# Patient Record
Sex: MALE | Race: WHITE | Employment: OTHER | URBAN - METROPOLITAN AREA
[De-identification: names, ages, dates, MRNs, and addresses within clinical notes are randomized per-mention and may not be internally consistent; named-entity substitution may affect disease eponyms.]

---

## 2022-10-24 ENCOUNTER — OFFICE VISIT (OUTPATIENT)
Dept: BARIATRICS/WEIGHT MGMT | Age: 42
End: 2022-10-24
Payer: MEDICAID

## 2022-10-24 VITALS
DIASTOLIC BLOOD PRESSURE: 77 MMHG | BODY MASS INDEX: 50.62 KG/M2 | SYSTOLIC BLOOD PRESSURE: 120 MMHG | TEMPERATURE: 97.5 F | HEART RATE: 85 BPM | HEIGHT: 66 IN | WEIGHT: 315 LBS

## 2022-10-24 DIAGNOSIS — I10 ESSENTIAL HYPERTENSION: Primary | ICD-10-CM

## 2022-10-24 DIAGNOSIS — E66.01 CLASS 3 SEVERE OBESITY DUE TO EXCESS CALORIES WITH SERIOUS COMORBIDITY AND BODY MASS INDEX (BMI) OF 50.0 TO 59.9 IN ADULT (HCC): ICD-10-CM

## 2022-10-24 PROBLEM — K21.9 GASTROESOPHAGEAL REFLUX DISEASE: Status: ACTIVE | Noted: 2022-05-20

## 2022-10-24 PROBLEM — E11.9 DIABETES MELLITUS (HCC): Status: ACTIVE | Noted: 2021-06-09

## 2022-10-24 PROCEDURE — G8427 DOCREV CUR MEDS BY ELIG CLIN: HCPCS | Performed by: INTERNAL MEDICINE

## 2022-10-24 PROCEDURE — G8417 CALC BMI ABV UP PARAM F/U: HCPCS | Performed by: INTERNAL MEDICINE

## 2022-10-24 PROCEDURE — 1036F TOBACCO NON-USER: CPT | Performed by: INTERNAL MEDICINE

## 2022-10-24 PROCEDURE — 99202 OFFICE O/P NEW SF 15 MIN: CPT

## 2022-10-24 PROCEDURE — G8484 FLU IMMUNIZE NO ADMIN: HCPCS | Performed by: INTERNAL MEDICINE

## 2022-10-24 PROCEDURE — 99204 OFFICE O/P NEW MOD 45 MIN: CPT | Performed by: INTERNAL MEDICINE

## 2022-10-24 RX ORDER — DULAGLUTIDE 4.5 MG/.5ML
4.5 INJECTION, SOLUTION SUBCUTANEOUS
COMMUNITY

## 2022-10-24 RX ORDER — LISINOPRIL 10 MG/1
TABLET ORAL
COMMUNITY

## 2022-10-24 RX ORDER — ROSUVASTATIN CALCIUM 20 MG/1
TABLET, COATED ORAL
COMMUNITY

## 2022-10-24 RX ORDER — OMEPRAZOLE 40 MG/1
CAPSULE, DELAYED RELEASE ORAL
COMMUNITY

## 2022-10-24 NOTE — PROGRESS NOTES
CC -   HTN, DM2, Obesity    Would like to be at least <200 lbs (150-160 lbs)    BACKGROUND -   First visit: 10/24/22    Obesity (all weight in lbs)  Began in childhood  Initial Ht 65.5, Wt 357.4,  BMI 58.57  HS Grad wt little silvia 300 (~325 lbs)   Lowest   wt 300-325   Highest  wt 450 (2.5-3 years ago)  Pattern of wt gain: gradual  Wt change past yr: -100 lbs  Most wt lost: 100 lbs  Other diets attempted: watching portion size, cut down carbohydrate    Desire to lose weight: ---/10    Initial Diet:    Number of meals per day - 2 (s-L)    Number of snacks per day - 1    Meal volume - 12\" plate,  usually seconds    Fast food/convenience store - 2x/week    Restaurants (not fast food) - 0x/week (rare)   Sweets - 7d/week (2 miniatures or 2 regular oreos etc)   Chips - 0d/week   Crackers/pretzels - 0d/week   Nuts - 0d/week   Peanut Butter - 4-5d/week (1 tbsp on toast)   Popcorn - 0d/week   Dried fruit - 0d/week   Whole fruit - 0-1d/week   Breakfast cereal - 0d/week   Granola/Protein/Energy bar - 0d/week   Sugar sweetened beverages - diet soda 1 can day with dinner, no fruit juice, no coffee or tea, no energy drink (mostly water at least 64 oz/day)   Protein - No supplements   Fiber - No supplements    Wt effect of HR foods = 1400 Reinaldo/wk = 200 Reinaldo/d= 8% DEN = 21 lb/year. Initial Exercise:    [employer] - not currently    Walking - few x/wk 20-30 min 1-2x/wk    Running - no    Resistance - no    Aerobic class - no    ______________________    [facility] [place] [facility] -  Past Medical History:   Diagnosis Date    DM2 (diabetes mellitus, type 2) (HCC)     GERD (gastroesophageal reflux disease)     HTN (hypertension)     Hyperlipidemia      Past Surgical History:   Procedure Laterality Date    WISDOM TOOTH EXTRACTION       Prior to Admission medications    Medication Sig Start Date End Date Taking?  Authorizing Provider   Multiple Vitamin (MULTIVITAMIN ADULT PO) multivitamin   daily prn    Historical Provider, MD   Dulaglutide (Sean Layne) 4.5 MG/0.5ML SOPN 4.5 mg    Historical Provider, MD   empagliflozin (JARDIANCE) 25 MG tablet Jardiance 25 mg tablet   take 1 tablet by mouth every morning    Historical Provider, MD   Lisdexamfetamine Dimesylate (VYVANSE) 20 MG CAPS Vyvanse 20 mg capsule   Take 1 capsule every day by oral route in the morning for 30 days. Historical Provider, MD   lisinopril (PRINIVIL;ZESTRIL) 10 MG tablet lisinopril 10 mg tablet   one daily    Historical Provider, MD   omeprazole (PRILOSEC) 40 MG delayed release capsule omeprazole 40 mg capsule,delayed release   Take 1 capsule every day by oral route in the morning for 90 days. Historical Provider, MD   rosuvastatin (CRESTOR) 20 MG tablet rosuvastatin 20 mg tablet   Take 1 tablet every day by oral route in the evening for 90 days. Historical Provider, MD   Lisinopril 5 mg daily currently. Allergies: No Known Allergies Stomach upset with Metformin. Social history: smoking: never ; Alcohol: no , work:  (mostly sitting). ROS -  Card - no CP  GI - no N/V/D/C    PE -  Gen : /77 (Site: Left Upper Arm, Position: Sitting, Cuff Size: Large Adult)   Pulse 85   Temp 97.5 °F (36.4 °C) (Temporal)   Ht 5' 5.5\" (1.664 m)   Wt (!) 357 lb 6.4 oz (162.1 kg)   BMI 58.57 kg/m²    WN, WD, NAD  Lung: Nml resp effort, CTA B/L  Heart:  RRR w/o MGR, trace LE pitting edema on right, 1+ edema on left  Psych: Normal mood   Full affect  Neuro: Moves all ext well    Last a1c was 8 per pt.  ______________________    HISTORY & ASSESSMENT/PLAN -     Problem 1 - Hypertension   HPI   - controlled on current medication (lisinopril), patient asymptomatic. Assessment  - controlled  Plan   - Continue medication. Weight reduction can help.  Weight reduction per plan below      Problem 2  - Obesity   HPI   - See above Background for description    Weight  Date    357.4  10/24/22    Patient's estimated daily energy need (DEN) = 2560 Reinaldo/d = 73432 Reinaldo/wk    Assessment  - Uncontrolled    Plan   - Talked about various options. Would like to continue calorie conscious diet as detailed below. He has Trulicity for DM, and on Vyvanse currently 20 mg daily with a plan to taper to 10 and then stop. He would like to see how he does off the medicine first. Planned as follows:  Patient Instructions   Rules:  Continue calorie conscious diet (target <1700 Reinaldo/d)  Limit sweets to one day per month  Limit chips/crackers/pretzels/nuts/popcorn to 150 reinaldo/day  Eliminate all sugar sweetened beverages (including fruit juice)  Limit restaurants (including fast food and food from a convenience store) to one time every two weeks while in town    Requirements:  Make sure protein intake is at least 70 grams per day (if you take a supplement it should be <200 Reinaldo, >25 g protein)  Make sure that fiber intake is at least 25 grams per day  Take one multivitamin every day    Targets:  Walk 30 minutes daily  Avoid eating 2 hours within bedtime. Tips:  Do not eat outside of the dining room or the kitchen  Do not eat while watching TV, videos, working on the computer or using a smart phone  Do not eat food out of a multi-serving bag or container. Follow up in 4 weeks. Total time spent on encounter: 45 min. April Franco MD  Internal Medicine/Obesity Medicine  10/24/2022.

## 2022-10-24 NOTE — PATIENT INSTRUCTIONS
Rules:  Continue calorie conscious diet (target <1700 Reinaldo/d)  Limit sweets to one day per month  Limit chips/crackers/pretzels/nuts/popcorn to 150 reinaldo/day  Eliminate all sugar sweetened beverages (including fruit juice)  Limit restaurants (including fast food and food from a convenience store) to one time every two weeks while in town    Requirements:  Make sure protein intake is at least 70 grams per day (if you take a supplement it should be <200 Reinaldo, >25 g protein)  Make sure that fiber intake is at least 25 grams per day  Take one multivitamin every day    Targets:  Walk 30 minutes daily  Avoid eating 2 hours within bedtime. Tips:  Do not eat outside of the dining room or the kitchen  Do not eat while watching TV, videos, working on the computer or using a smart phone  Do not eat food out of a multi-serving bag or container. Follow up in 4 weeks.

## 2022-11-22 ENCOUNTER — OFFICE VISIT (OUTPATIENT)
Dept: BARIATRICS/WEIGHT MGMT | Age: 42
End: 2022-11-22
Payer: MEDICAID

## 2022-11-22 VITALS
HEART RATE: 86 BPM | DIASTOLIC BLOOD PRESSURE: 84 MMHG | SYSTOLIC BLOOD PRESSURE: 129 MMHG | WEIGHT: 315 LBS | BODY MASS INDEX: 50.62 KG/M2 | TEMPERATURE: 97.7 F | HEIGHT: 66 IN

## 2022-11-22 DIAGNOSIS — E66.01 CLASS 3 SEVERE OBESITY DUE TO EXCESS CALORIES WITH SERIOUS COMORBIDITY AND BODY MASS INDEX (BMI) OF 50.0 TO 59.9 IN ADULT (HCC): ICD-10-CM

## 2022-11-22 DIAGNOSIS — I10 ESSENTIAL HYPERTENSION: Primary | ICD-10-CM

## 2022-11-22 PROCEDURE — 1036F TOBACCO NON-USER: CPT | Performed by: INTERNAL MEDICINE

## 2022-11-22 PROCEDURE — 3078F DIAST BP <80 MM HG: CPT | Performed by: INTERNAL MEDICINE

## 2022-11-22 PROCEDURE — 3074F SYST BP LT 130 MM HG: CPT | Performed by: INTERNAL MEDICINE

## 2022-11-22 PROCEDURE — G8427 DOCREV CUR MEDS BY ELIG CLIN: HCPCS | Performed by: INTERNAL MEDICINE

## 2022-11-22 PROCEDURE — G8417 CALC BMI ABV UP PARAM F/U: HCPCS | Performed by: INTERNAL MEDICINE

## 2022-11-22 PROCEDURE — G8484 FLU IMMUNIZE NO ADMIN: HCPCS | Performed by: INTERNAL MEDICINE

## 2022-11-22 PROCEDURE — 99211 OFF/OP EST MAY X REQ PHY/QHP: CPT

## 2022-11-22 PROCEDURE — 99214 OFFICE O/P EST MOD 30 MIN: CPT | Performed by: INTERNAL MEDICINE

## 2022-11-22 RX ORDER — PHENTERMINE HYDROCHLORIDE 37.5 MG/1
37.5 TABLET ORAL
Qty: 30 TABLET | Refills: 0 | Status: SHIPPED | OUTPATIENT
Start: 2022-11-22 | End: 2022-12-22

## 2022-11-22 NOTE — PATIENT INSTRUCTIONS
Rules:  Continue calorie conscious diet (target <1700 Reinaldo/d)  Limit sweets to one day per month  Limit chips/crackers/pretzels/nuts/popcorn to 150 reinaldo/day  Eliminate all sugar sweetened beverages (including fruit juice)  Limit restaurants (including fast food and food from a convenience store) to one time every two weeks while in town    Requirements:  Make sure protein intake is at least 70 grams per day  Make sure that fiber intake is at least 25 grams per day  Take one multivitamin every day    Targets:  Walk 30 minutes daily  Avoid eating 2 hours within bedtime. Tips:  Do not eat outside of the dining room or the kitchen  Do not eat while watching TV, videos, working on the computer or using a smart phone  Do not eat food out of a multi-serving bag or container. Low calorie non-starchy vegetables:  Food (per 100 g) Calories Fibers T. Carbohydrates Protein Fat Sodium (mg) Potassium (mg)   Green Bell Peppers 10 1.7 4.6 0.9 0.2 3 175   Cucumbers 15 0.5 3.6 0.7 0.1 2 147   Celery 16 1.6 3 0.7 0.2 80 260   Tomatoes 18 1.2 3.9 0.9 0.2 5 237   Carrots 41 2.8 10 0.9 0.2 69 320   Cabbage 25 2.5 6 1.3 0.1 18 170   Broccoli 35 3.3 7.2 2.4 0.4 41 293   Cauliflower 23 2.3 4.1 1.8 0.5 15 142   Iceberg Lettuce 14 1.2 3 0.9 0.1 10 141   Kale 28 2 5.6 1.9 0.4 23 228   Brussel Sprouts 43 3.8 9 3.4 0.3 25 389   Spinach 23 2.4 3.8 3 0.3 70 466   Zucchini 15 1 2.7 1.1 0.4 3 264   Mushroom 28 2.2 5.3 2.2 0.5 2 356   Asparagus 22 2 4.1 2.4 0.2 14 224   Green Beans 35 3.2 7.9 1.9 0.3 1 146   Eggplant 35 2.5 8.7 0.8 0.2 1 123       Phentermine:  Take phentermine 37.5 mg, one-half to one tablet daily as needed for appetite suppression. Take each dose 30-90 min before effect will be needed. While taking phentermine, check the Blood Pressure every morning and every evening. If the systolic BP is >384 mmHg, the diastolic BP is >27 mm/Hg or the heart rate is > 100 beats per minute, do not take phentermine that day.     If the systolic BP is consistently >155 mmHg, the diastolic BP is consistently above 90 mm/Hg or the heart rate is consistently > 100 beats per minute, then stop taking phentermine altogether. If the systolic BP >144 mmHg or the diastolic BP is >210 mmHg (even if it is only once), then phentermine should be stopped altogether without proving that any of these are consistently elevated. Follow up in 30 days.

## 2022-11-22 NOTE — PROGRESS NOTES
CC -   Follow up of: HTN, DM2, Obesity    Would like to be at least <200 lbs (150-160 lbs)    BACKGROUND -   Last visit: 10/24/22  First visit: 10/24/22    Obesity (all weight in lbs)  Began in childhood  Initial Ht 65.5, Wt 357.4,  BMI 58.57  HS Grad wt little silvia 300 (~325 lbs)   Lowest   wt 300-325   Highest  wt 450 (2.5-3 years ago)  Pattern of wt gain: gradual  Wt change past yr: -100 lbs  Most wt lost: 100 lbs  Other diets attempted: watching portion size, cut down carbohydrate    Desire to lose weight: ---/10    Initial Diet:    Number of meals per day - 2 (s-L)    Number of snacks per day - 1    Meal volume - 12\" plate,  usually seconds    Fast food/convenience store - 2x/week    Restaurants (not fast food) - 0x/week (rare)   Sweets - 7d/week (2 miniatures or 2 regular oreos etc)   Chips - 0d/week   Crackers/pretzels - 0d/week   Nuts - 0d/week   Peanut Butter - 4-5d/week (1 tbsp on toast)   Popcorn - 0d/week   Dried fruit - 0d/week   Whole fruit - 0-1d/week   Breakfast cereal - 0d/week   Granola/Protein/Energy bar - 0d/week   Sugar sweetened beverages - diet soda 1 can day with dinner, no fruit juice, no coffee or tea, no energy drink (mostly water at least 64 oz/day)   Protein - No supplements   Fiber - No supplements    Wt effect of HR foods = 1400 Reinaldo/wk = 200 Reinaldo/d= 8% DEN = 21 lb/year. Initial Exercise:    [employer] - not currently    Walking - few x/wk 20-30 min 1-2x/wk    Running - no    Resistance - no    Aerobic class - no    ______________________    [address] [place] [address] -  Past Medical History:   Diagnosis Date    DM2 (diabetes mellitus, type 2) (HCC)     GERD (gastroesophageal reflux disease)     HTN (hypertension)     Hyperlipidemia      Past Surgical History:   Procedure Laterality Date    WISDOM TOOTH EXTRACTION       Prior to Admission medications    Medication Sig Start Date End Date Taking?  Authorizing Provider   Multiple Vitamin (MULTIVITAMIN ADULT PO) multivitamin   daily prn    Historical Provider, MD   Dulaglutide (TRULICITY) 4.5 GT/7.4LX SOPN 4.5 mg    Historical Provider, MD   empagliflozin (JARDIANCE) 25 MG tablet Jardiance 25 mg tablet   take 1 tablet by mouth every morning    Historical Provider, MD   lisinopril (PRINIVIL;ZESTRIL) 10 MG tablet lisinopril 10 mg tablet   one daily    Historical Provider, MD   omeprazole (PRILOSEC) 40 MG delayed release capsule omeprazole 40 mg capsule,delayed release   Take 1 capsule every day by oral route in the morning for 90 days. Historical Provider, MD   rosuvastatin (CRESTOR) 20 MG tablet rosuvastatin 20 mg tablet   Take 1 tablet every day by oral route in the evening for 90 days. Historical Provider, MD   Lisinopril 5 mg daily currently. Allergies: No Known Allergies Stomach upset with Metformin. Social history: smoking: never ; Alcohol: no , work:  (mostly sitting). ROS -  Card - no CP  GI - no N/V/D/C    PE -  Gen : /84 (Site: Left Upper Arm, Position: Sitting, Cuff Size: Large Adult)   Pulse 86   Temp 97.7 °F (36.5 °C) (Temporal)   Ht 5' 5.5\" (1.664 m)   Wt (!) 358 lb (162.4 kg)   BMI 58.67 kg/m²    WN, WD, NAD  Lung: Nml resp effort, CTA B/L  Heart:  RRR w/o MGR, trace LE pitting edema on right, 1+ edema on left  Psych: Normal mood   Full affect  Neuro: Moves all ext well    Last a1c was 8 per pt.  ______________________    HISTORY & ASSESSMENT/PLAN -     Problem 1 - Hypertension   HPI   - controlled on current medication (lisinopril), patient asymptomatic. Assessment  - controlled  Plan   - Continue medication. Weight reduction can help de-escalate dose. Weight reduction per plan below      Problem 2  - Obesity   HPI   - See above Background for description    Weight  Date    357.4  10/24/22    358.0  11/22/22    Total weight change to date: +0.6 lbs. Average daily energy variance:  10/24/2022 - 11/22/2022: +0.6 lbs (2100 Reinaldo)/28 d = +75 Reinaldo/d excess.     Patient's estimated daily energy need (DEN) = 2560 Reinaldo/d = 07128 Reinaldo/wk    Update:  Calorie conscious diet  Sweets: 30d/month (small amount every day)  SSB: diet soda, no fruit juice  Restaurant food: 2-3x/wk  Appetite suppressant: on Trulicity, off Vyvanse now. Home BP monitoring: None currently  Protein: chicken, ground turkey, fish, deli ham, cheese/cottage cheese, peanut butter  Fiber: vegetables  Water: 20 oz 2-3x/d. Activities: walk 3-4x/wk, ~15-20 min. Assessment  - Uncontrolled    Plan   - Vyvanse was stopped, and patient feels he has been struggling with appetite control. On trulicity 4.5 mg weekly. We planned to start Phentermine, otherwise continue current plan. Count calories if able (talked about apps - '[email or web address]'). Planned as follows:  Patient Instructions   Rules:  Continue calorie conscious diet (target <1700 Reinaldo/d)  Limit sweets to one day per month  Limit chips/crackers/pretzels/nuts/popcorn to 150 reinadlo/day  Eliminate all sugar sweetened beverages (including fruit juice)  Limit restaurants (including fast food and food from a convenience store) to one time every two weeks while in town    Requirements:  Make sure protein intake is at least 70 grams per day  Make sure that fiber intake is at least 25 grams per day  Take one multivitamin every day    Targets:  Walk 30 minutes daily  Avoid eating 2 hours within bedtime. Tips:  Do not eat outside of the dining room or the kitchen  Do not eat while watching TV, videos, working on the computer or using a smart phone  Do not eat food out of a multi-serving bag or container. List of low calorie non-starchy vegetables was provided. Phentermine:  Take phentermine 37.5 mg, one-half to one tablet daily as needed for appetite suppression. Take each dose 30-90 min before effect will be needed. While taking phentermine, check the Blood Pressure every morning and every evening.      If the systolic BP is >733 mmHg, the diastolic BP is >98 mm/Hg or the heart rate is > 100 beats per minute, do not take phentermine that day. If the systolic BP is consistently >155 mmHg, the diastolic BP is consistently above 90 mm/Hg or the heart rate is consistently > 100 beats per minute, then stop taking phentermine altogether. If the systolic BP >623 mmHg or the diastolic BP is >484 mmHg (even if it is only once), then phentermine should be stopped altogether without proving that any of these are consistently elevated. Follow up in 30 days. Medication management: Phentermine    Matti Muñiz MD  Internal Medicine/Obesity Medicine  11/22/2022.

## 2022-12-28 ENCOUNTER — OFFICE VISIT (OUTPATIENT)
Dept: BARIATRICS/WEIGHT MGMT | Age: 42
End: 2022-12-28
Payer: MEDICAID

## 2022-12-28 VITALS
DIASTOLIC BLOOD PRESSURE: 70 MMHG | BODY MASS INDEX: 50.62 KG/M2 | HEART RATE: 86 BPM | HEIGHT: 66 IN | WEIGHT: 315 LBS | SYSTOLIC BLOOD PRESSURE: 115 MMHG | TEMPERATURE: 97.4 F

## 2022-12-28 DIAGNOSIS — I10 ESSENTIAL HYPERTENSION: Primary | ICD-10-CM

## 2022-12-28 DIAGNOSIS — E66.01 CLASS 3 SEVERE OBESITY DUE TO EXCESS CALORIES WITH SERIOUS COMORBIDITY AND BODY MASS INDEX (BMI) OF 50.0 TO 59.9 IN ADULT (HCC): ICD-10-CM

## 2022-12-28 PROCEDURE — 1036F TOBACCO NON-USER: CPT | Performed by: INTERNAL MEDICINE

## 2022-12-28 PROCEDURE — G8428 CUR MEDS NOT DOCUMENT: HCPCS | Performed by: INTERNAL MEDICINE

## 2022-12-28 PROCEDURE — 99211 OFF/OP EST MAY X REQ PHY/QHP: CPT

## 2022-12-28 PROCEDURE — G8484 FLU IMMUNIZE NO ADMIN: HCPCS | Performed by: INTERNAL MEDICINE

## 2022-12-28 PROCEDURE — 3074F SYST BP LT 130 MM HG: CPT | Performed by: INTERNAL MEDICINE

## 2022-12-28 PROCEDURE — G8417 CALC BMI ABV UP PARAM F/U: HCPCS | Performed by: INTERNAL MEDICINE

## 2022-12-28 PROCEDURE — 99214 OFFICE O/P EST MOD 30 MIN: CPT | Performed by: INTERNAL MEDICINE

## 2022-12-28 PROCEDURE — 3078F DIAST BP <80 MM HG: CPT | Performed by: INTERNAL MEDICINE

## 2022-12-28 RX ORDER — PHENTERMINE HYDROCHLORIDE 37.5 MG/1
TABLET ORAL
Qty: 30 TABLET | Refills: 0 | Status: SHIPPED | OUTPATIENT
Start: 2022-12-28 | End: 2023-01-27

## 2022-12-28 NOTE — PATIENT INSTRUCTIONS
CC -   Follow up of: HTN, DM2, Obesity     Would like to be at least <200 lbs (150-160 lbs)     BACKGROUND -   Last visit: 10/24/22  First visit: 10/24/22     Obesity (all weight in lbs)  Began in childhood  Initial Ht 65.5, Wt 357.4,  BMI 58.57  HS Grad wt little silvia 300 (~325 lbs)   Lowest   wt 300-325   Highest  wt 450 (2.5-3 years ago)  Pattern of wt gain: gradual  Wt change past yr: -100 lbs  Most wt lost: 100 lbs  Other diets attempted: watching portion size, cut down carbohydrate     Desire to lose weight: ---/10     Initial Diet:            Number of meals per day       - 2 (s-L)            Number of snacks per day      - 1            Meal volume                           - 12\" plate,  usually seconds            Fast food/convenience store  - 2x/week            Restaurants (not fast food)     - 0x/week (rare)           Sweets                                    - 7d/week (2 miniatures or 2 regular oreos etc)           Chips                                      - 0d/week           Crackers/pretzels                    - 0d/week           Nuts                                        - 0d/week           Peanut Butter                          - 4-5d/week (1 tbsp on toast)           Popcorn                                  - 0d/week           Dried fruit                                - 0d/week           Whole fruit                              - 0-1d/week           Breakfast cereal                      - 0d/week           Granola/Protein/Energy bar    - 0d/week           Sugar sweetened beverages - diet soda 1 can day with dinner, no fruit juice, no coffee or tea, no energy drink (mostly water at least 64 oz/day)           Protein                                    - No supplements           Fiber                                       - No supplements     Wt effect of HR foods = 1400 Reinaldo/wk = 200 Reinaldo/d= 8% DEN = 21 lb/year.           Initial Exercise:            [employer]    - not currently            Walking B/L  Heart:  RRR w/o MGR, trace LE pitting edema on right, 1+ edema on left  Psych: Normal mood              Full affect  Neuro: Moves all ext well     Last a1c was 8 per pt.  ______________________     HISTORY & ASSESSMENT/PLAN -      Problem 1        - Hypertension   HPI                  - controlled on current medication (lisinopril), patient asymptomatic. Assessment    - controlled  Plan                 - Continue medication. Weight reduction can help de-escalate dose. Weight reduction per plan below        Problem 2        - Obesity   HPI                  - See above Background for description                          Weight             Date                          357.4               10/24/22                          358.0               11/22/22     Total weight change to date: +0.6 lbs. Average daily energy variance:  10/24/2022 - 11/22/2022: +0.6 lbs (2100 Reinaldo)/28 d = +75 Reinaldo/d excess. Patient's estimated daily energy need (DEN) = 2560 Reinaldo/d = 41029 Reinaldo/wk     Update:  Calorie conscious diet  Sweets: 30d/month (small amount every day)  SSB: diet soda, no fruit juice  Restaurant food: 2-3x/wk  Appetite suppressant: on Trulicity, off Vyvanse now. Home BP monitoring: None currently  Protein: chicken, ground turkey, fish, deli ham, cheese/cottage cheese, peanut butter  Fiber: vegetables  Water: 20 oz 2-3x/d. Activities: walk 3-4x/wk, ~15-20 min. Assessment    - Uncontrolled     Plan                 - Vyvanse was stopped, and patient feels he has been struggling with appetite control. On trulicity 4.5 mg weekly. We planned to start Phentermine, otherwise continue current plan. Count calories if able (talked about apps - 'baritastic').  Planned as follows:  Patient Instructions   Rules:  Continue calorie conscious diet (target <1700 Reinaldo/d)  Limit sweets to one day per month  Limit chips/crackers/pretzels/nuts/popcorn to 150 reinaldo/day  Eliminate all sugar sweetened beverages (including fruit juice)  Limit restaurants (including fast food and food from a convenience store) to one time every two weeks while in town     Requirements:  Make sure protein intake is at least 70 grams per day  Make sure that fiber intake is at least 25 grams per day  Take one multivitamin every day     Targets:  Walk 30 minutes daily  Avoid eating 2 hours within bedtime. Tips:  Do not eat outside of the dining room or the kitchen  Do not eat while watching TV, videos, working on the computer or using a smart phone  Do not eat food out of a multi-serving bag or container. List of low calorie non-starchy vegetables was provided. Phentermine:  Take phentermine 37.5 mg, one-half to one tablet daily as needed for appetite suppression. Take each dose 30-90 min before effect will be needed. While taking phentermine, check the Blood Pressure every morning and every evening. If the systolic BP is >339 mmHg, the diastolic BP is >83 mm/Hg or the heart rate is > 100 beats per minute, do not take phentermine that day. If the systolic BP is consistently >155 mmHg, the diastolic BP is consistently above 90 mm/Hg or the heart rate is consistently > 100 beats per minute, then stop taking phentermine altogether. If the systolic BP >906 mmHg or the diastolic BP is >453 mmHg (even if it is only once), then phentermine should be stopped altogether without proving that any of these are consistently elevated. Follow up in 30 days.      Medication management: Phentermine

## 2022-12-28 NOTE — PROGRESS NOTES
CC -   Follow up of: HTN, Obesity     Would like to be at least <200 lbs (150-160 lbs)     BACKGROUND -   Last visit: 12/28/22  First visit: 10/24/22     Obesity (all weight in lbs)  Began in childhood  Initial Ht 65.5, Wt 357.4,  BMI 58.57  HS Grad wt little silvia 300 (~325 lbs)   Lowest   wt 300-325   Highest  wt 450 (2.5-3 years ago)  Pattern of wt gain: gradual  Wt change past yr: -100 lbs  Most wt lost: 100 lbs  Other diets attempted: watching portion size, cut down carbohydrate     Desire to lose weight: ---/10     Initial Diet:            Number of meals per day       - 2 (s-L)            Number of snacks per day      - 1            Meal volume                           - 12\" plate,  usually seconds            Fast food/convenience store  - 2x/week            Restaurants (not fast food)     - 0x/week (rare)           Sweets                                    - 7d/week (2 miniatures or 2 regular oreos etc)           Chips                                      - 0d/week           Crackers/pretzels                    - 0d/week           Nuts                                        - 0d/week           Peanut Butter                          - 4-5d/week (1 tbsp on toast)           Popcorn                                  - 0d/week           Dried fruit                                - 0d/week           Whole fruit                              - 0-1d/week           Breakfast cereal                      - 0d/week           Granola/Protein/Energy bar    - 0d/week           Sugar sweetened beverages - diet soda 1 can day with dinner, no fruit juice, no coffee or tea, no energy drink (mostly water at least 64 oz/day)           Protein                                    - No supplements           Fiber                                       - No supplements     Wt effect of HR foods = 1400 Reinaldo/wk = 200 Reinaldo/d= 8% DEN = 21 lb/year.           Initial Exercise:            [employer]    - not currently            Walking - few x/wk 20-30 min 1-2x/wk            Running                   - no            Resistance               - no            Aerobic class           - no     ______________________     [facility] -  Past Medical History        Past Medical History:   Diagnosis Date    DM2 (diabetes mellitus, type 2) (HCC)      GERD (gastroesophageal reflux disease)      HTN (hypertension)      Hyperlipidemia           Past Surgical History         Past Surgical History:   Procedure Laterality Date    WISDOM TOOTH EXTRACTION             Home Medications           Prior to Admission medications    Medication Sig Start Date End Date Taking? Authorizing Provider   Multiple Vitamin (MULTIVITAMIN ADULT PO) multivitamin   daily prn       Historical Provider, MD   Dulaglutide (TRULICITY) 4.5 VW/0.5YS SOPN 4.5 mg       Historical Provider, MD   empagliflozin (JARDIANCE) 25 MG tablet Jardiance 25 mg tablet   take 1 tablet by mouth every morning       Historical Provider, MD   lisinopril (PRINIVIL;ZESTRIL) 10 MG tablet lisinopril 10 mg tablet   one daily       Historical Provider, MD   omeprazole (PRILOSEC) 40 MG delayed release capsule omeprazole 40 mg capsule,delayed release   Take 1 capsule every day by oral route in the morning for 90 days. Historical Provider, MD   rosuvastatin (CRESTOR) 20 MG tablet rosuvastatin 20 mg tablet   Take 1 tablet every day by oral route in the evening for 90 days. Historical Provider, MD      Lisinopril 5 mg daily currently. Allergies: No Known Allergies Stomach upset with Metformin. Social history: smoking: never ; Alcohol: no , work:  (mostly sitting).        PE -  Gen :   /70 (Site: Left Upper Arm, Position: Sitting, Cuff Size: Large Adult)   Pulse 86   Temp 97.4 °F (36.3 °C) (Temporal)   Ht 5' 5.5\" (1.664 m)   Wt (!) 354 lb (160.6 kg)   BMI 58.01 kg/m²               WN, WD, NAD  Lung:   Nml resp effort, CTA B/L  Heart:  RRR w/o MGR, 2+ pitting edema b/l  Psych: Normal mood              Full affect  Neuro: Moves all ext well     Last a1c was 8 per pt.  ______________________     HISTORY & ASSESSMENT/PLAN -      Problem 1        - Hypertension   HPI                  - BP today 115/70      Home monitoring 110's/upper 70's (checks daily)      Regimen: Lisinopril 10 mg daily  Assessment    - controlled  Plan                 - Continue medication. Weight reduction can help de-escalate dose. Weight reduction per plan below        Problem 2        - Obesity   HPI                  - See above Background for description                          Weight             Date                          357.4 lbs         10/24/22                          358.0               11/22/22      354.0   12/28/22  Total weight change to date: +0.6 lbs. Average daily energy variance:  10/24/2022 - 11/22/2022: +0.6 lbs (2100 Reinaldo)/28 d = +75 Reinaldo/d excess. 11/22/2022 - 12/28/2022: - 4.0 lbs (18236 Reinaldo)/36d = - 388 Reinaldo/d deficit  Patient's estimated daily energy need (DEN) = 2560 Reinaldo/d = 28415 Reinaldo/wk     Update:  Calorie monitoring: + calorie conscious  Sweets: 1d/wk  SSB: none  Restaurant food: 0-1x/wk  Appetite suppressant: Phentermine 37.5 mg, one tablet daily (takes in the morning), appetite suppression 3-4/10; side effects: + dry mouth  Home BP monitoring: controlled (see above)  Protein: does not monitor; subjectively choosing high protein foods  Fiber: no supplement  Water: >64 oz  Activities: walk 3x/wk, 30 min.    Assessment    - Improved; cont present plan; work towards calorie counting (I discussed creating a calorie-controlled menu); needs to take phentermine later in the day  Plan                 -   Rules:  Continue calorie conscious diet (target <1700 Reinaldo/d)  Limit sweets to one day per month  Limit chips/crackers/pretzels/nuts/popcorn to 150 reinaldo/day  Eliminate all sugar sweetened beverages (including fruit juice)  Limit restaurants (including fast food and food from a convenience store)

## 2023-01-23 ENCOUNTER — OFFICE VISIT (OUTPATIENT)
Dept: BARIATRICS/WEIGHT MGMT | Age: 43
End: 2023-01-23
Payer: MEDICAID

## 2023-01-23 VITALS
TEMPERATURE: 98.1 F | HEIGHT: 66 IN | BODY MASS INDEX: 50.62 KG/M2 | WEIGHT: 315 LBS | HEART RATE: 89 BPM | DIASTOLIC BLOOD PRESSURE: 68 MMHG | SYSTOLIC BLOOD PRESSURE: 116 MMHG

## 2023-01-23 DIAGNOSIS — I10 ESSENTIAL HYPERTENSION: Primary | ICD-10-CM

## 2023-01-23 DIAGNOSIS — E66.01 CLASS 3 SEVERE OBESITY DUE TO EXCESS CALORIES WITH SERIOUS COMORBIDITY AND BODY MASS INDEX (BMI) OF 50.0 TO 59.9 IN ADULT (HCC): ICD-10-CM

## 2023-01-23 PROCEDURE — 99211 OFF/OP EST MAY X REQ PHY/QHP: CPT

## 2023-01-23 PROCEDURE — G8417 CALC BMI ABV UP PARAM F/U: HCPCS | Performed by: INTERNAL MEDICINE

## 2023-01-23 PROCEDURE — 3078F DIAST BP <80 MM HG: CPT | Performed by: INTERNAL MEDICINE

## 2023-01-23 PROCEDURE — 99213 OFFICE O/P EST LOW 20 MIN: CPT | Performed by: INTERNAL MEDICINE

## 2023-01-23 PROCEDURE — G8484 FLU IMMUNIZE NO ADMIN: HCPCS | Performed by: INTERNAL MEDICINE

## 2023-01-23 PROCEDURE — 1036F TOBACCO NON-USER: CPT | Performed by: INTERNAL MEDICINE

## 2023-01-23 PROCEDURE — G8428 CUR MEDS NOT DOCUMENT: HCPCS | Performed by: INTERNAL MEDICINE

## 2023-01-23 PROCEDURE — 3074F SYST BP LT 130 MM HG: CPT | Performed by: INTERNAL MEDICINE

## 2023-01-23 RX ORDER — ORAL SEMAGLUTIDE 7 MG/1
TABLET ORAL
COMMUNITY

## 2023-01-23 RX ORDER — ORAL SEMAGLUTIDE 3 MG/1
TABLET ORAL
COMMUNITY

## 2023-01-23 RX ORDER — ORAL SEMAGLUTIDE 14 MG/1
TABLET ORAL
COMMUNITY

## 2023-01-23 NOTE — PROGRESS NOTES
CC -   Follow up of: HTN, DM2, Obesity    Would like to be at least <200 lbs (150-160 lbs)    BACKGROUND -   Last visit: 12/28/22  First visit: 10/24/22    Obesity (all weight in lbs)  Began in childhood  Initial Ht 65.5, Wt 357.4,  BMI 58.57  HS Grad wt little silvia 300 (~325 lbs)   Lowest   wt 300-325   Highest  wt 450 (2.5-3 years ago)  Pattern of wt gain: gradual  Wt change past yr: -100 lbs  Most wt lost: 100 lbs  Other diets attempted: watching portion size, cut down carbohydrate    Desire to lose weight: ---/10    Initial Diet:    Number of meals per day - 2 (s-L)    Number of snacks per day - 1    Meal volume - 12\" plate,  usually seconds    Fast food/convenience store - 2x/week    Restaurants (not fast food) - 0x/week (rare)   Sweets - 7d/week (2 miniatures or 2 regular oreos etc)   Chips - 0d/week   Crackers/pretzels - 0d/week   Nuts - 0d/week   Peanut Butter - 4-5d/week (1 tbsp on toast)   Popcorn - 0d/week   Dried fruit - 0d/week   Whole fruit - 0-1d/week   Breakfast cereal - 0d/week   Granola/Protein/Energy bar - 0d/week   Sugar sweetened beverages - diet soda 1 can day with dinner, no fruit juice, no coffee or tea, no energy drink (mostly water at least 64 oz/day)   Protein - No supplements   Fiber - No supplements    Wt effect of HR foods = 1400 Reinaldo/wk = 200 Reinaldo/d= 8% DEN = 21 lb/year. Initial Exercise:    [employer] - not currently    Walking - few x/wk 20-30 min 1-2x/wk    Running - no    Resistance - no    Aerobic class - no    ______________________    [facility] -  Past Medical History:   Diagnosis Date    DM2 (diabetes mellitus, type 2) (HCC)     GERD (gastroesophageal reflux disease)     HTN (hypertension)     Hyperlipidemia      Past Surgical History:   Procedure Laterality Date    WISDOM TOOTH EXTRACTION       Prior to Admission medications    Medication Sig Start Date End Date Taking?  Authorizing Provider   phentermine (ADIPEX-P) 37.5 MG tablet Take one-half to one tablet every day 12/28/22 1/27/23  Alicja Luther MD   Multiple Vitamin (MULTIVITAMIN ADULT PO) multivitamin   daily prn    Historical Provider, MD   Dulaglutide (TRULICITY) 4.5 NK/5.8BS SOPN 4.5 mg    Historical Provider, MD   empagliflozin (JARDIANCE) 25 MG tablet Jardiance 25 mg tablet   take 1 tablet by mouth every morning    Historical Provider, MD   lisinopril (PRINIVIL;ZESTRIL) 10 MG tablet lisinopril 10 mg tablet   one daily    Historical Provider, MD   omeprazole (PRILOSEC) 40 MG delayed release capsule omeprazole 40 mg capsule,delayed release   Take 1 capsule every day by oral route in the morning for 90 days. Historical Provider, MD   rosuvastatin (CRESTOR) 20 MG tablet rosuvastatin 20 mg tablet   Take 1 tablet every day by oral route in the evening for 90 days. Historical Provider, MD   Lisinopril 5 mg daily currently - same 1/23/23. Allergies: Allergies   Allergen Reactions    Metformin Diarrhea    Stomach upset with Metformin. Social history: smoking: never ; Alcohol: no , work:  (mostly sitting). ROS -  Card - no CP  GI - no N/V/D/C    PE -  Gen : /68 (Site: Left Lower Arm, Position: Sitting, Cuff Size: Medium Adult)   Pulse 89   Temp 98.1 °F (36.7 °C) (Temporal)   Ht 5' 5.5\" (1.664 m)   Wt (!) 354 lb 3.2 oz (160.7 kg)   BMI 58.05 kg/m²    WN, WD, NAD  Lung: Nml resp effort, CTA B/L  Heart:  RRR w/o MGR, trace LE pitting edema on right, 1+ edema on left  Psych: Normal mood   Full affect  Neuro: Moves all ext well    Last a1c was 8 per pt.  ______________________    HISTORY & ASSESSMENT/PLAN -     Problem 1 - Hypertension   HPI   - controlled on current medication (lisinopril), patient asymptomatic. Assessment  - controlled  Plan   - Continue medication. Weight reduction can help de-escalate dose.  Weight reduction per plan below      Problem 2  - Obesity   HPI   - See above Background for description    Weight  Date    357.4  10/24/22    358.0  11/22/22   Phentermine #1    354.0  12/28/22    Phentermine #2    354.2  1/23/23      -    Total weight change to date: 0.6 lbs. Average daily energy variance:  10/24/2022 - 11/22/2022: +0.6 lbs (2100 Reinaldo)/28 d = +75 Reinaldo/d excess. 11/22/2022 - 12/28/2022: -4.0 lbs (93793 Reinaldo)/36 d = -389 Reinaldo/d deficit. 12/28/2022 - 1/23/2023: +0.2 lbs (700 Reinaldo)/26 d = +27 Reinaldo/d excess. Patient's estimated daily energy need (DEN) = 2560 Reinaldo/d = 31778 Reinaldo/wk    Update:  Calorie conscious diet - trying to  Sweets: 30d/month (Has continued small amount every day)  SSB: diet soda, no fruit juice  Restaurant food: 1x/wk smt less. Appetite suppressant: on Trulicity, off Vyvanse. Not taking Phentermine because it caused him to be jittery disturbed his sleep (stopped after a couple of days). Trulicity shortage ->on Rybelsus that seems to be helping. Home BP monitoring: None currently  Protein: chicken, ground turkey, fish, deli ham, cheese/cottage cheese, peanut butter  Fiber: vegetables  Water: 20 oz 2-3x/d. Activities: walk 3-4x/wk, ~15-20 min - trying to. Evenings have been hard, and is trying to have a small lunch so that he is not very hungry in the evening. Assessment  - Uncontrolled    Plan   - Overall, he feels he is doing ok. He is cutting down high risk foods. We talked about cutting down sweets as well. Has less restaurant food, diet soda, no fruit juice. Watching portions. Did not tolerate Phentermine well, and stopped taking it after a couple of days last month. Trulicity was in shortage, and was started on Rybelsus po currently 3 mg but has plan to go to 7 and then 14 mg. It seems to be helping with appetite suppression as well. He would like to follow up as needed, if he has difficulty with weight loss or appetite control. We also talked about dietician, psychologist, and surgical services available here.  Planned as follows:  Patient Instructions   Rules:  Continue calorie conscious diet (target <1700 Reinaldo/d)  Limit sweets to one day per month  Limit chips/crackers/pretzels/nuts/popcorn to 150 shlomo/day  Eliminate all sugar sweetened beverages (including fruit juice)  Limit restaurants (including fast food and food from a convenience store) to one time every two weeks while in town    Requirements:  Make sure protein intake is at least 70 grams per day  Make sure that fiber intake is at least 25 grams per day  Take one multivitamin every day    Targets:  Walk 30 minutes daily  Avoid eating 2 hours within bedtime. Tips:  Do not eat outside of the dining room or the kitchen  Do not eat while watching TV, videos, working on the computer or using a smart phone  Do not eat food out of a multi-serving bag or container. Follow up as needed. Total time spent on this encounter: 20 minutes. Yoel Bojorquez MD  Internal Medicine/Obesity Medicine  1/23/2023.

## 2023-01-23 NOTE — PATIENT INSTRUCTIONS
Rules:  Continue calorie conscious diet (target <1700 Reinaldo/d)  Limit sweets to one day per month  Limit chips/crackers/pretzels/nuts/popcorn to 150 reinaldo/day  Eliminate all sugar sweetened beverages (including fruit juice)  Limit restaurants (including fast food and food from a convenience store) to one time every two weeks while in town    Requirements:  Make sure protein intake is at least 70 grams per day  Make sure that fiber intake is at least 25 grams per day  Take one multivitamin every day    Targets:  Walk 30 minutes daily  Avoid eating 2 hours within bedtime. Tips:  Do not eat outside of the dining room or the kitchen  Do not eat while watching TV, videos, working on the computer or using a smart phone  Do not eat food out of a multi-serving bag or container. Follow up as needed.